# Patient Record
Sex: MALE | Race: WHITE | Employment: UNEMPLOYED | URBAN - METROPOLITAN AREA
[De-identification: names, ages, dates, MRNs, and addresses within clinical notes are randomized per-mention and may not be internally consistent; named-entity substitution may affect disease eponyms.]

---

## 2018-02-22 ENCOUNTER — OFFICE VISIT (OUTPATIENT)
Dept: PRIMARY CARE CLINIC | Age: 28
End: 2018-02-22
Payer: OTHER GOVERNMENT

## 2018-02-22 VITALS
DIASTOLIC BLOOD PRESSURE: 72 MMHG | HEART RATE: 58 BPM | RESPIRATION RATE: 20 BRPM | HEIGHT: 70 IN | OXYGEN SATURATION: 97 % | TEMPERATURE: 97.4 F | WEIGHT: 196 LBS | SYSTOLIC BLOOD PRESSURE: 112 MMHG | BODY MASS INDEX: 28.06 KG/M2

## 2018-02-22 DIAGNOSIS — F98.8 ATTENTION DEFICIT DISORDER, UNSPECIFIED HYPERACTIVITY PRESENCE: Primary | ICD-10-CM

## 2018-02-22 PROCEDURE — 99213 OFFICE O/P EST LOW 20 MIN: CPT | Performed by: FAMILY MEDICINE

## 2018-02-22 ASSESSMENT — PATIENT HEALTH QUESTIONNAIRE - PHQ9
SUM OF ALL RESPONSES TO PHQ9 QUESTIONS 1 & 2: 0
2. FEELING DOWN, DEPRESSED OR HOPELESS: 0
SUM OF ALL RESPONSES TO PHQ QUESTIONS 1-9: 0
1. LITTLE INTEREST OR PLEASURE IN DOING THINGS: 0

## 2018-02-22 ASSESSMENT — ENCOUNTER SYMPTOMS
CONSTIPATION: 0
EYE PAIN: 0
APNEA: 0
COLOR CHANGE: 0
EYE REDNESS: 0
EYE DISCHARGE: 0
PHOTOPHOBIA: 0
CHEST TIGHTNESS: 0
DIARRHEA: 0
CHOKING: 0
WHEEZING: 0
NAUSEA: 0
ABDOMINAL PAIN: 0
STRIDOR: 0
FACIAL SWELLING: 0

## 2018-02-22 NOTE — PROGRESS NOTES
Subjective:      Patient ID: Jacob Arana is a 32 y.o. male who presents today for:  Chief Complaint   Patient presents with    ADD     x over 1 month pt started school 1/2018 after  a discharge from the [facility]. patient has c/o difficulties focusing at school. patient satets outside activites are easier to focus on        HPI   ADD  Patient is here today for difficulty focusing, concentrating in school x about a month. 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 He states that doing \"new\" activities are sometimes challenging since being out of the [facility]. Past Medical History:   Diagnosis Date    Ankle pain 7/6/2015    Bilateral shoulder pain, neg rheumatoid w/u 8/17/2015    HA (headache) 7/14/2015    Labral tear of shoulder 11/5/2015    Normal eye exam 2014    Shoulder pain 7/6/2015     Past Surgical History:   Procedure Laterality Date    KNEE SURGERY Right     SHOULDER SURGERY Right 10/12/15    D Alhaji Peres     No family history on file. Social History     Social History    Marital status:      Spouse name: N/A    Number of children: N/A    Years of education: N/A     Occupational History    Not on file. Social History Main Topics    Smoking status: Never Smoker    Smokeless tobacco: Never Used    Alcohol use Yes      Comment: Once a week    Drug use: Unknown    Sexual activity: Not on file     Other Topics Concern    Not on file     Social History Narrative    No narrative on file     Allergies:  Review of patient's allergies indicates no known allergies. Review of Systems   Constitutional: Negative for activity change, appetite change, chills, diaphoresis and fever. HENT: Negative for congestion, ear discharge, ear pain, facial swelling, hearing loss and mouth sores. Eyes: Negative for photophobia, pain, discharge and redness. Respiratory: Negative for apnea, choking, chest tightness, wheezing and stridor.     Cardiovascular: Negative for chest pain, palpitations and leg swelling. Gastrointestinal: Negative for abdominal pain, constipation, diarrhea and nausea. Endocrine: Negative for cold intolerance, heat intolerance, polydipsia and polyphagia. Genitourinary: Negative for dysuria and frequency. Musculoskeletal: Negative for gait problem, joint swelling, neck pain and neck stiffness. Skin: Negative for color change, pallor, rash and wound. Allergic/Immunologic: Negative for immunocompromised state. Neurological: Negative for tremors, syncope, facial asymmetry, speech difficulty and headaches. Psychiatric/Behavioral: Positive for decreased concentration. Negative for confusion and hallucinations. The patient is not nervous/anxious and is not hyperactive. Objective:   /72 (Site: Left Arm, Position: Sitting, Cuff Size: Medium Adult)   Pulse 58   Temp 97.4 °F (36.3 °C) (Tympanic)   Resp 20   Ht 5' 10\" (1.778 m)   Wt 196 lb (88.9 kg)   SpO2 97%   BMI 28.12 kg/m²     Physical Exam   Constitutional: He is oriented to person, place, and time. He appears well-developed and well-nourished. HENT:   Head: Normocephalic and atraumatic. Mouth/Throat: Oropharynx is clear and moist. No oropharyngeal exudate. Eyes: Conjunctivae and EOM are normal. Pupils are equal, round, and reactive to light. Right eye exhibits no discharge. Left eye exhibits no discharge. No scleral icterus. Neck: Normal range of motion. Neck supple. No thyromegaly present. Cardiovascular: Normal rate, regular rhythm, normal heart sounds and intact distal pulses. Exam reveals no gallop and no friction rub. No murmur heard. Pulmonary/Chest: Effort normal and breath sounds normal. No respiratory distress. He has no wheezes. He has no rales. He exhibits no tenderness. Abdominal: Soft. Bowel sounds are normal. He exhibits no distension and no mass. There is no tenderness. There is no rebound and no guarding.    Lymphadenopathy:     He has no cervical

## 2018-03-22 ENCOUNTER — OFFICE VISIT (OUTPATIENT)
Dept: PRIMARY CARE CLINIC | Age: 28
End: 2018-03-22
Payer: OTHER GOVERNMENT

## 2018-03-22 VITALS
TEMPERATURE: 98.2 F | BODY MASS INDEX: 28.2 KG/M2 | SYSTOLIC BLOOD PRESSURE: 124 MMHG | RESPIRATION RATE: 16 BRPM | WEIGHT: 197 LBS | HEIGHT: 70 IN | OXYGEN SATURATION: 98 % | HEART RATE: 77 BPM | DIASTOLIC BLOOD PRESSURE: 74 MMHG

## 2018-03-22 DIAGNOSIS — F98.8 ATTENTION DEFICIT DISORDER, UNSPECIFIED HYPERACTIVITY PRESENCE: Primary | ICD-10-CM

## 2018-03-22 PROCEDURE — 99213 OFFICE O/P EST LOW 20 MIN: CPT | Performed by: FAMILY MEDICINE

## 2018-03-22 RX ORDER — IBUPROFEN 400 MG/1
400 TABLET ORAL EVERY 6 HOURS PRN
COMMUNITY
End: 2018-08-21

## 2018-03-22 ASSESSMENT — ENCOUNTER SYMPTOMS
CHEST TIGHTNESS: 0
CHOKING: 0
EYE DISCHARGE: 0
EYE REDNESS: 0
FACIAL SWELLING: 0
NAUSEA: 0
EYE PAIN: 0
WHEEZING: 0
PHOTOPHOBIA: 0
APNEA: 0
DIARRHEA: 0
CONSTIPATION: 0
ABDOMINAL PAIN: 0
STRIDOR: 0
COLOR CHANGE: 0

## 2018-04-03 ENCOUNTER — TELEPHONE (OUTPATIENT)
Dept: PRIMARY CARE CLINIC | Age: 28
End: 2018-04-03

## 2018-06-05 LAB
A/G RATIO: 1.7 (ref 0.9–2.4)
ALBUMIN: 4.7 G/DL (ref 3.4–5)
ALP BLD-CCNC: 62 U/L (ref 45–117)
ALT SERPL-CCNC: 19 U/L (ref 10–52)
ANION GAP SERPL CALCULATED.3IONS-SCNC: 10 MMOL/L (ref 10–20)
AST SERPL-CCNC: 28 U/L (ref 13–39)
BASOPHILS # BLD: 0.8 % (ref 0–1.3)
BASOPHILS ABSOLUTE: 0.03 10*3/UL (ref 0.01–0.09)
BICARBONATE: 26 MMOL/L (ref 21–32)
BILIRUB SERPL-MCNC: 0.5 MG/DL (ref 0–1.2)
BUN / CREAT RATIO: 19 (ref 5–25)
CALCIUM SERPL-MCNC: 9.7 MG/DL (ref 8.6–10.3)
CHLORIDE BLD-SCNC: 106 MMOL/L (ref 98–107)
CREAT SERPL-MCNC: 1.04 MG/DL (ref 0.5–1.3)
EOSINOPHIL # BLD: 1.7 % (ref 0–6.7)
EOSINOPHILS ABSOLUTE: 0.06 10*3/UL (ref 0.01–0.46)
ERYTHROCYTE [DISTWIDTH] IN BLOOD BY AUTOMATED COUNT: 11.9 % (ref 12–15.4)
ERYTHROCYTE [DISTWIDTH] IN BLOOD BY AUTOMATED COUNT: 39.2 FL (ref 39.3–48.6)
GFR CALCULATED: >60
GLUCOSE: 91 MG/DL (ref 70–100)
HCT VFR BLD CALC: 44.8 % (ref 38.4–54.9)
HEMOGLOBIN: 14.9 G/DL (ref 12.8–17.7)
IMMATURE GRANS (ABS): 0.01 10*3/UL (ref 0–0.21)
IMMATURE GRANULOCYTES: 0.3 %
INR BLD: 1.09 (ref 0.9–1.1)
LYMPHOCYTES # BLD: 39.7 % (ref 9.4–41.1)
LYMPHOCYTES ABSOLUTE: 1.44 10*3/UL (ref 0.4–2.84)
MCH RBC QN AUTO: 30 PG (ref 27.5–32.9)
MCHC RBC AUTO-ENTMCNC: 33.3 G/DL (ref 30.5–35.4)
MCV RBC AUTO: 90.3 FL (ref 83.3–98.2)
MONOCYTES # BLD: 10.5 % (ref 3–16.2)
MONOCYTES ABSOLUTE: 0.38 10*3/UL (ref 0.25–1.33)
NEUTROPHILS ABSOLUTE: 1.71 10*3/UL (ref 2.22–7.53)
NEUTROPHILS: 47 % (ref 46.2–79.1)
NUCLEATED RBCS: 0 /100{WBCS}
PARTIAL THROMBOPLASTIN TIME: 30.1 SEC (ref 25–36)
PLATELET # BLD: 152 10*3/UL (ref 155–404)
PMV BLD AUTO: 11.2 FL (ref 9.9–12.1)
POTASSIUM SERPL-SCNC: 4 MMOL/L (ref 3.5–5.1)
PROTHROMBIN TIME: 12.1 SEC (ref 9.8–12.7)
RBC: 4.96 10*6/UL (ref 4.08–6.37)
RBCS COUNTED: 0 10*3/UL
SODIUM BLD-SCNC: 138 MMOL/L (ref 136–145)
TOTAL PROTEIN: 7.4 G/DL (ref 6.4–8.2)
UREA NITROGEN: 20 MG/DL (ref 6–23)
WBC: 3.6 10*3/UL (ref 4.2–11)

## 2018-08-21 ENCOUNTER — OFFICE VISIT (OUTPATIENT)
Dept: PRIMARY CARE CLINIC | Age: 28
End: 2018-08-21
Payer: OTHER GOVERNMENT

## 2018-08-21 VITALS
DIASTOLIC BLOOD PRESSURE: 70 MMHG | BODY MASS INDEX: 27.63 KG/M2 | HEIGHT: 70 IN | RESPIRATION RATE: 12 BRPM | TEMPERATURE: 98.2 F | WEIGHT: 193 LBS | SYSTOLIC BLOOD PRESSURE: 108 MMHG | HEART RATE: 68 BPM

## 2018-08-21 DIAGNOSIS — F98.8 ATTENTION DEFICIT DISORDER, UNSPECIFIED HYPERACTIVITY PRESENCE: Primary | ICD-10-CM

## 2018-08-21 DIAGNOSIS — M25.562 CHRONIC PAIN OF BOTH KNEES: ICD-10-CM

## 2018-08-21 DIAGNOSIS — M25.561 CHRONIC PAIN OF BOTH KNEES: ICD-10-CM

## 2018-08-21 DIAGNOSIS — G89.29 CHRONIC PAIN OF BOTH KNEES: ICD-10-CM

## 2018-08-21 PROCEDURE — 99213 OFFICE O/P EST LOW 20 MIN: CPT | Performed by: FAMILY MEDICINE

## 2018-08-21 RX ORDER — MELOXICAM 15 MG/1
TABLET ORAL
Refills: 0 | COMMUNITY
Start: 2018-06-06 | End: 2018-11-21 | Stop reason: ALTCHOICE

## 2018-08-21 ASSESSMENT — ENCOUNTER SYMPTOMS
NAUSEA: 0
STRIDOR: 0
EYE DISCHARGE: 0
CONSTIPATION: 0
CHEST TIGHTNESS: 0
APNEA: 0
CHOKING: 0
FACIAL SWELLING: 0
EYE PAIN: 0
DIARRHEA: 0
ABDOMINAL PAIN: 0
WHEEZING: 0
PHOTOPHOBIA: 0
EYE REDNESS: 0
COLOR CHANGE: 0

## 2018-08-21 NOTE — PROGRESS NOTES
He has no cervical adenopathy. Neurological: He is alert and oriented to person, place, and time. Skin: Skin is warm and dry. Psychiatric: He has a normal mood and affect. His behavior is normal. Judgment and thought content normal.   Nursing note and vitals reviewed. Assessment:      Diagnosis Orders   1. Attention deficit disorder, unspecified hyperactivity presence     2. Chronic pain of both knees, Zanotti         Plan:      No orders of the defined types were placed in this encounter. No orders of the defined types were placed in this encounter. Controlled Substances Monitoring: Attestation: The Prescription Monitoring Report for this patient was reviewed today. Casimer Sacks, DO)  Documentation: No signs of potential drug abuse or diversion identified. (Casimer Sacks, DO)    No Follow-up on file. I, Rosa Bo CMA   , am scribing for and in the presence of Romero Rodriguez DO. Electronically signed by :  Rosa Pérez DO, personally performed the services described in this documentation, as scribed by Tyrone Lomeli CMA   in my presence, and it is both accurate and complete.  Electronically signed by: Romero Rodriguez DO    8/21/18 2:05 PM    Romero Rodriguez DO

## 2018-11-21 ENCOUNTER — OFFICE VISIT (OUTPATIENT)
Dept: PRIMARY CARE CLINIC | Age: 28
End: 2018-11-21
Payer: OTHER GOVERNMENT

## 2018-11-21 VITALS
HEIGHT: 70 IN | TEMPERATURE: 99.2 F | OXYGEN SATURATION: 97 % | BODY MASS INDEX: 28.18 KG/M2 | SYSTOLIC BLOOD PRESSURE: 110 MMHG | WEIGHT: 196.8 LBS | RESPIRATION RATE: 14 BRPM | HEART RATE: 80 BPM | DIASTOLIC BLOOD PRESSURE: 72 MMHG

## 2018-11-21 DIAGNOSIS — J02.9 PHARYNGITIS, UNSPECIFIED ETIOLOGY: Primary | ICD-10-CM

## 2018-11-21 DIAGNOSIS — F98.8 ATTENTION DEFICIT DISORDER, UNSPECIFIED HYPERACTIVITY PRESENCE: ICD-10-CM

## 2018-11-21 PROCEDURE — 99213 OFFICE O/P EST LOW 20 MIN: CPT | Performed by: FAMILY MEDICINE

## 2018-11-21 RX ORDER — AZITHROMYCIN 250 MG/1
TABLET, FILM COATED ORAL
Qty: 6 TABLET | Refills: 0 | Status: SHIPPED | OUTPATIENT
Start: 2018-11-21

## 2018-11-21 ASSESSMENT — ENCOUNTER SYMPTOMS
COUGH: 0
EYE DISCHARGE: 0
COLOR CHANGE: 0
PHOTOPHOBIA: 0
DIARRHEA: 0
NAUSEA: 0
CONSTIPATION: 0
ABDOMINAL PAIN: 0
FACIAL SWELLING: 0
CHANGE IN BOWEL HABIT: 0
SORE THROAT: 1
WHEEZING: 0
CHOKING: 0
EYE PAIN: 0
STRIDOR: 0
APNEA: 0
EYE REDNESS: 0
CHEST TIGHTNESS: 0
VOMITING: 0
VISUAL CHANGE: 0
SWOLLEN GLANDS: 0

## 2018-11-21 NOTE — PROGRESS NOTES
known allergies. Review of Systems   Constitutional: Positive for chills and fatigue. Negative for activity change, appetite change, diaphoresis and fever. HENT: Positive for sore throat. Negative for congestion, ear discharge, ear pain, facial swelling, hearing loss and mouth sores. Eyes: Negative for photophobia, pain, discharge and redness. Respiratory: Negative for apnea, cough, choking, chest tightness, wheezing and stridor. Cardiovascular: Negative for chest pain, palpitations and leg swelling. Gastrointestinal: Negative for abdominal pain, anorexia, change in bowel habit, constipation, diarrhea, nausea and vomiting. Endocrine: Negative for cold intolerance, heat intolerance, polydipsia and polyphagia. Genitourinary: Negative for dysuria and frequency. Musculoskeletal: Positive for arthralgias. Negative for gait problem, joint swelling, myalgias, neck pain and neck stiffness. Skin: Negative for color change, pallor, rash and wound. Allergic/Immunologic: Negative for immunocompromised state. Neurological: Positive for weakness and headaches. Negative for vertigo, tremors, syncope, facial asymmetry, speech difficulty and numbness. Psychiatric/Behavioral: Negative for confusion and hallucinations. The patient is not nervous/anxious and is not hyperactive. Objective:   /72 (Site: Right Upper Arm, Position: Sitting, Cuff Size: Medium Adult)   Pulse 80   Temp 99.2 °F (37.3 °C) (Tympanic)   Resp 14   Ht 5' 10\" (1.778 m)   Wt 196 lb 12.8 oz (89.3 kg)   SpO2 97%   BMI 28.24 kg/m²     Physical Exam   Constitutional: He is oriented to person, place, and time. He appears well-developed and well-nourished. No distress. HENT:   Head: Normocephalic and atraumatic. Right Ear: External ear normal.   Left Ear: External ear normal.   Nose: Nose normal.   Mouth/Throat: Posterior oropharyngeal edema and posterior oropharyngeal erythema present.    Eyes: Pupils are equal, round, and reactive to light. Conjunctivae and EOM are normal. Right eye exhibits no discharge. No scleral icterus. Neck: Normal range of motion. Neck supple. No tracheal deviation present. No thyromegaly present. Cardiovascular: Normal rate, regular rhythm, normal heart sounds and intact distal pulses. Exam reveals no gallop and no friction rub. No murmur heard. Pulmonary/Chest: Effort normal and breath sounds normal. No respiratory distress. He has no wheezes. He has no rales. He exhibits no tenderness. Lymphadenopathy:     He has no cervical adenopathy. Neurological: He is alert and oriented to person, place, and time. Coordination normal.   Skin: Skin is warm and dry. He is not diaphoretic. Psychiatric: He has a normal mood and affect. His behavior is normal. Judgment and thought content normal.       Assessment:      Diagnosis Orders   1. Pharyngitis, unspecified etiology  azithromycin (ZITHROMAX Z-DANIELITO) 250 MG tablet   2. Attention deficit disorder, unspecified hyperactivity presence         Plan:      No orders of the defined types were placed in this encounter. Orders Placed This Encounter   Medications    azithromycin (ZITHROMAX Z-DANIELITO) 250 MG tablet     Sig: Take 2 pills today then one daily til finished     Dispense:  6 tablet     Refill:  0       Controlled Substances Monitoring:     No Follow-up on file. Christi PARKER ([facility])  , am scribing for and in the presence of [place] Colorader[place]Â® Life, DO. Electronically signed by :Matthieu Reynoso CMA ([facility])    I, [place] Life, DO, personally performed the services described in this documentation, as scribed by [id number]   in my presence, and it is both accurate and complete.  Electronically signed by: [place] Life, DO    11/21/18 2:25 PM    Supa Davidson DO

## 2019-05-02 ENCOUNTER — TELEPHONE (OUTPATIENT)
Dept: PRIMARY CARE CLINIC | Age: 29
End: 2019-05-02

## 2021-04-07 NOTE — PROGRESS NOTES
Quality 130: Documentation Of Current Medications In The Medical Record: Current Medications Documented Subjective:      Patient ID: Bruna Carrera is a 32 y.o. male who presents today for:  Chief Complaint   Patient presents with    ADHD     Pt is here for his ADD and taking 30 mg Vyvanse. Pt states he had abdominal pain at first and taking later than 7 am , he has trouble  sleeping. Once he knew to eat a big breakfast and take med at same time he has been better. abotu a week to get in rhythm. HPI   ADD  Patient is here today for f/u on ADD. He is taking Vyvanse 30mg and admits to abdominal pain initially and difficulty sleeping. He states once he began eating with his dose and took the medication earlier in the day these issues have resolved. He states he feels good about this medication and would like a refill today. Past Medical History:   Diagnosis Date    Ankle pain 7/6/2015    Bilateral shoulder pain, neg rheumatoid w/u 8/17/2015    HA (headache) 7/14/2015    Labral tear of shoulder 11/5/2015    Normal eye exam 2014    Shoulder pain 7/6/2015     Past Surgical History:   Procedure Laterality Date    KNEE SURGERY Right     SHOULDER SURGERY Right 10/12/15    D Florina Code     No family history on file. Social History     Social History    Marital status:      Spouse name: N/A    Number of children: N/A    Years of education: N/A     Occupational History    Not on file. Social History Main Topics    Smoking status: Never Smoker    Smokeless tobacco: Never Used    Alcohol use Yes      Comment: Once a week    Drug use: Unknown    Sexual activity: Not on file     Other Topics Concern    Not on file     Social History Narrative    No narrative on file     Allergies:  Patient has no known allergies. Review of Systems   Constitutional: Negative for activity change, appetite change, chills, diaphoresis, fatigue and fever. HENT: Negative for congestion, ear discharge, ear pain, facial swelling, hearing loss and mouth sores.     Eyes: Negative for photophobia, pain, discharge and Detail Level: Zone

## 2024-01-26 ENCOUNTER — LAB (OUTPATIENT)
Dept: LAB | Facility: LAB | Age: 34
End: 2024-01-26
Payer: COMMERCIAL

## 2024-01-26 ENCOUNTER — OFFICE VISIT (OUTPATIENT)
Dept: PRIMARY CARE | Facility: CLINIC | Age: 34
End: 2024-01-26
Payer: COMMERCIAL

## 2024-01-26 VITALS
DIASTOLIC BLOOD PRESSURE: 70 MMHG | WEIGHT: 193 LBS | OXYGEN SATURATION: 98 % | BODY MASS INDEX: 28.58 KG/M2 | HEART RATE: 62 BPM | SYSTOLIC BLOOD PRESSURE: 114 MMHG | HEIGHT: 69 IN | TEMPERATURE: 98 F

## 2024-01-26 DIAGNOSIS — Z00.00 WELL ADULT EXAM: ICD-10-CM

## 2024-01-26 DIAGNOSIS — E66.3 OVERWEIGHT (BMI 25.0-29.9): ICD-10-CM

## 2024-01-26 DIAGNOSIS — Z00.00 WELL ADULT EXAM: Primary | ICD-10-CM

## 2024-01-26 DIAGNOSIS — G47.26 SHIFT WORK SLEEP DISORDER: ICD-10-CM

## 2024-01-26 LAB
25(OH)D3 SERPL-MCNC: 24 NG/ML (ref 30–100)
ALBUMIN SERPL BCP-MCNC: 4.3 G/DL (ref 3.4–5)
ALP SERPL-CCNC: 63 U/L (ref 33–120)
ALT SERPL W P-5'-P-CCNC: 13 U/L (ref 10–52)
ANION GAP SERPL CALC-SCNC: 11 MMOL/L (ref 10–20)
AST SERPL W P-5'-P-CCNC: 21 U/L (ref 9–39)
BASOPHILS # BLD AUTO: 0.03 X10*3/UL (ref 0–0.1)
BASOPHILS NFR BLD AUTO: 0.9 %
BILIRUB SERPL-MCNC: 0.4 MG/DL (ref 0–1.2)
BUN SERPL-MCNC: 21 MG/DL (ref 6–23)
CALCIUM SERPL-MCNC: 9 MG/DL (ref 8.6–10.3)
CHLORIDE SERPL-SCNC: 106 MMOL/L (ref 98–107)
CHOLEST SERPL-MCNC: 131 MG/DL (ref 0–199)
CHOLESTEROL/HDL RATIO: 3.3
CO2 SERPL-SCNC: 29 MMOL/L (ref 21–32)
CREAT SERPL-MCNC: 1.03 MG/DL (ref 0.5–1.3)
EGFRCR SERPLBLD CKD-EPI 2021: >90 ML/MIN/1.73M*2
EOSINOPHIL # BLD AUTO: 0.09 X10*3/UL (ref 0–0.7)
EOSINOPHIL NFR BLD AUTO: 2.6 %
ERYTHROCYTE [DISTWIDTH] IN BLOOD BY AUTOMATED COUNT: 12.2 % (ref 11.5–14.5)
EST. AVERAGE GLUCOSE BLD GHB EST-MCNC: 108 MG/DL
GLUCOSE SERPL-MCNC: 71 MG/DL (ref 74–99)
HBA1C MFR BLD: 5.4 %
HCT VFR BLD AUTO: 42.1 % (ref 41–52)
HDLC SERPL-MCNC: 39.2 MG/DL
HGB BLD-MCNC: 13.5 G/DL (ref 13.5–17.5)
IMM GRANULOCYTES # BLD AUTO: 0 X10*3/UL (ref 0–0.7)
IMM GRANULOCYTES NFR BLD AUTO: 0 % (ref 0–0.9)
LDLC SERPL CALC-MCNC: 78 MG/DL
LYMPHOCYTES # BLD AUTO: 1.36 X10*3/UL (ref 1.2–4.8)
LYMPHOCYTES NFR BLD AUTO: 39.5 %
MCH RBC QN AUTO: 29.6 PG (ref 26–34)
MCHC RBC AUTO-ENTMCNC: 32.1 G/DL (ref 32–36)
MCV RBC AUTO: 92 FL (ref 80–100)
MONOCYTES # BLD AUTO: 0.49 X10*3/UL (ref 0.1–1)
MONOCYTES NFR BLD AUTO: 14.2 %
NEUTROPHILS # BLD AUTO: 1.47 X10*3/UL (ref 1.2–7.7)
NEUTROPHILS NFR BLD AUTO: 42.8 %
NON HDL CHOLESTEROL: 92 MG/DL (ref 0–149)
NRBC BLD-RTO: 0 /100 WBCS (ref 0–0)
PLATELET # BLD AUTO: 159 X10*3/UL (ref 150–450)
POTASSIUM SERPL-SCNC: 4.4 MMOL/L (ref 3.5–5.3)
PROT SERPL-MCNC: 6.7 G/DL (ref 6.4–8.2)
RBC # BLD AUTO: 4.56 X10*6/UL (ref 4.5–5.9)
SODIUM SERPL-SCNC: 142 MMOL/L (ref 136–145)
TRIGL SERPL-MCNC: 69 MG/DL (ref 0–149)
TSH SERPL-ACNC: 2.42 MIU/L (ref 0.44–3.98)
VLDL: 14 MG/DL (ref 0–40)
WBC # BLD AUTO: 3.4 X10*3/UL (ref 4.4–11.3)

## 2024-01-26 PROCEDURE — 80061 LIPID PANEL: CPT

## 2024-01-26 PROCEDURE — 85025 COMPLETE CBC W/AUTO DIFF WBC: CPT

## 2024-01-26 PROCEDURE — 1036F TOBACCO NON-USER: CPT | Performed by: FAMILY MEDICINE

## 2024-01-26 PROCEDURE — 82306 VITAMIN D 25 HYDROXY: CPT

## 2024-01-26 PROCEDURE — 83036 HEMOGLOBIN GLYCOSYLATED A1C: CPT

## 2024-01-26 PROCEDURE — 99385 PREV VISIT NEW AGE 18-39: CPT | Performed by: FAMILY MEDICINE

## 2024-01-26 PROCEDURE — 36415 COLL VENOUS BLD VENIPUNCTURE: CPT

## 2024-01-26 PROCEDURE — 80053 COMPREHEN METABOLIC PANEL: CPT

## 2024-01-26 PROCEDURE — 84443 ASSAY THYROID STIM HORMONE: CPT

## 2024-01-26 ASSESSMENT — PATIENT HEALTH QUESTIONNAIRE - PHQ9
2. FEELING DOWN, DEPRESSED OR HOPELESS: NOT AT ALL
1. LITTLE INTEREST OR PLEASURE IN DOING THINGS: NOT AT ALL
SUM OF ALL RESPONSES TO PHQ9 QUESTIONS 1 AND 2: 0

## 2024-01-26 NOTE — PROGRESS NOTES
Patient is here to establish.  He has not seen  In about 5 years.  He was in the  and has had knee replacements secondary to weight.  Patient otherwise has been doing well.  He has no chest pain palpitations.  No rashes moles or lesions.  No family history of colon cancer.    REVIEW OF SYSTEMS: 12 systems negative except for those mentioned in the HPI. Reviewed home risks with patient. Patient feels safe at home.    PHYSICAL EXAMINATION:   Constitutional: The patient is alert and oriented x3, in no acute  distress.  Eyes: Extraocular movements are intact. Pupils are equal and reactive to light  ENT: Bilateral TMs within normal limits. Nasal turbinates are within normal limits. Throat within normal limits.  Neck: Supple without lymphadenopathy or JVD.  Heart: Regular rate and rhythm without murmur, click, gallop, or rub.  Lungs: Clear to auscultation bilaterally. No rales, rhonchi, or  wheezing.  Abdomen: Soft, nontender, nondistended. Normoactive bowel sounds.   No palpable masses. Normal percussion  Musculoskeletal: 5/5 motor, upper and lower extremities.  Neurologic: Cranial nerves II through XII fully intact. +2/4 DTRs  in ankle and knee.  Psychiatric: Good judgment and insight. Normal affect and mood. No cognitive defects noted.  Lymphatic: Negative as noted above.  Skin: no rashes or lesions    Assessment:  Per EMR    Plan:  Patient will have annual blood work otherwise is doing well.  He is a  and does swing shift.  I discussed about possible help with this if necessary patient otherwise is doing well  Discussed with the patient and reviewed annual wellness questionnaire form as well as cognitive deficits. Also discussed with the patient immunizations and risks for colonoscopy and other screening procedures    This dictation was created using Dragon dictation and may contain errors

## 2024-01-29 ENCOUNTER — TELEPHONE (OUTPATIENT)
Dept: PRIMARY CARE | Facility: CLINIC | Age: 34
End: 2024-01-29
Payer: COMMERCIAL

## 2024-01-29 NOTE — TELEPHONE ENCOUNTER
----- Message from Abe Muniz DO sent at 1/29/2024  3:58 PM EST -----  Labs are all excellent including liver, kidney, thyroid, cholesterol and blood sugar.  Vitamin D is low and patient would benefit from 5000 international units daily or 50,000 once a week for about 6 months

## 2025-02-04 ENCOUNTER — HOSPITAL ENCOUNTER (OUTPATIENT)
Dept: RADIOLOGY | Facility: CLINIC | Age: 35
Discharge: HOME | End: 2025-02-04
Payer: COMMERCIAL

## 2025-02-04 ENCOUNTER — OFFICE VISIT (OUTPATIENT)
Dept: ORTHOPEDIC SURGERY | Facility: CLINIC | Age: 35
End: 2025-02-04
Payer: COMMERCIAL

## 2025-02-04 DIAGNOSIS — M25.521 RIGHT ELBOW PAIN: ICD-10-CM

## 2025-02-04 DIAGNOSIS — S46.211A RUPTURE OF RIGHT DISTAL BICEPS TENDON, INITIAL ENCOUNTER: Primary | ICD-10-CM

## 2025-02-04 PROCEDURE — 73080 X-RAY EXAM OF ELBOW: CPT | Mod: RIGHT SIDE | Performed by: ORTHOPAEDIC SURGERY

## 2025-02-04 PROCEDURE — 99204 OFFICE O/P NEW MOD 45 MIN: CPT | Performed by: ORTHOPAEDIC SURGERY

## 2025-02-04 PROCEDURE — 73080 X-RAY EXAM OF ELBOW: CPT | Mod: RT

## 2025-02-04 PROCEDURE — 99214 OFFICE O/P EST MOD 30 MIN: CPT | Performed by: ORTHOPAEDIC SURGERY

## 2025-02-04 NOTE — PROGRESS NOTES
Chief Complaint   Patient presents with    Right Arm - Pain     History of Present Illness:  Oswaldo Bazzi is a 34 y.o. male presenting to clinic as a new patient  for his right elbow. He was seen in the ED on 2/3/25 after he sustained a biceps injury lifting a box into his truck. He did feel and hear something tear. He has had pain since. He has some swelling into his hand. He is a .     Imaging:  X-rays right elbow: Shows no acute fractures or dislocations. No arthritic change.       Assessment:   Right distal biceps tendon rupture    Plan:  I recommended surgery for distal biceps repair. He is a  and we discussed limited duty and a post-operative elbow brace.   Continue ice and ibuprofen consistently.     Right Distal Biceps Tendon Repair  Risks benefits and alternatives to surgery were discussed including but not limited to Infection, bleeding, neurovascular injury, pain and dysfunction, hardware related complications including cutout failure breakage, loss of function, motion, and permanent disability as well as the cardiovascular and pulmonary complications from anesthesia including death and DVT. Patient and family accept these risks. We discussed specifically hardware related complications hardware cut out, failure, breakage, heterotopic ossification, lateral antebrachial cutaneous neuroma, radial nerve injury, loss in strength, function or motion and the need for revision surgeries  Plan for outpatient surgery:  1 week postop follow up with 3view x-rays.  Percocet for postop pain relief. OARRS has been reviewed and is consistent with prescribed medications. This report is scanned into the electronic medical record. The risks of abuse, dependence, addiction and diversion were considered. The medication is felt to be clinically appropriate based on documented diagnosis.  Pre-CERT for removal T scope elbow brace to be placed in the first postoperative visit.      Physical  Exam:  Right Elbow:  Skin healthy and intact  Positive Hook test  He has a obvious deformity at 6 cm with some retraction on the right, compared to 3 on the left.   Ecchymotic and swollen in the distal biceps   Normal neurovascular exam distally.  Palpable radial pulse, warm well perfused, sensation intact.      Review of Systems:  GENERAL: Negative for malaise, significant weight loss, fever  MUSCULOSKELETAL: See HPI  NEURO:  Negative     No past medical history on file.    Medication Documentation Review Audit       Reviewed by Candelaria Keith RN (Registered Nurse) on 01/26/24 at 0840      Medication Order Taking? Sig Documenting Provider Last Dose Status            No Medications to Display                                   Allergies   Allergen Reactions    Dog Dander Cough       Social History     Socioeconomic History    Marital status:      Spouse name: Not on file    Number of children: Not on file    Years of education: Not on file    Highest education level: Not on file   Occupational History    Not on file   Tobacco Use    Smoking status: Never    Smokeless tobacco: Never   Vaping Use    Vaping status: Never Used   Substance and Sexual Activity    Alcohol use: Not Currently    Drug use: Never    Sexual activity: Not on file   Other Topics Concern    Not on file   Social History Narrative    Not on file     Social Drivers of Health     Financial Resource Strain: Not on file   Food Insecurity: Not on file   Transportation Needs: Not on file   Physical Activity: Not on file   Stress: Not on file   Social Connections: Not on file   Intimate Partner Violence: Not on file   Housing Stability: Not on file       Past Surgical History:   Procedure Laterality Date    KNEE ARTHROSCOPY W/ MENISCAL REPAIR Left     and loose body removal       No results found.       Scribe Attestation  By signing my name below, Petra CADET Scribe   attest that this documentation has been prepared under the direction and in  the presence of Thomas Georges MD.

## 2025-02-06 ENCOUNTER — LAB (OUTPATIENT)
Dept: LAB | Facility: HOSPITAL | Age: 35
End: 2025-02-06
Payer: COMMERCIAL

## 2025-02-06 PROCEDURE — 85610 PROTHROMBIN TIME: CPT

## 2025-02-06 PROCEDURE — 85025 COMPLETE CBC W/AUTO DIFF WBC: CPT

## 2025-02-06 PROCEDURE — 80053 COMPREHEN METABOLIC PANEL: CPT

## 2025-02-06 PROCEDURE — 85730 THROMBOPLASTIN TIME PARTIAL: CPT

## 2025-02-07 DIAGNOSIS — S46.211A RUPTURE OF RIGHT DISTAL BICEPS TENDON, INITIAL ENCOUNTER: Primary | ICD-10-CM

## 2025-02-07 RX ORDER — OXYCODONE AND ACETAMINOPHEN 5; 325 MG/1; MG/1
1 TABLET ORAL EVERY 6 HOURS PRN
Qty: 20 TABLET | Refills: 0 | Status: SHIPPED | OUTPATIENT
Start: 2025-02-10 | End: 2025-02-17

## 2025-02-10 PROCEDURE — 24342 REPAIR OF RUPTURED TENDON: CPT | Performed by: ORTHOPAEDIC SURGERY

## 2025-02-10 PROCEDURE — 24342 REPAIR OF RUPTURED TENDON: CPT | Performed by: PHYSICIAN ASSISTANT

## 2025-02-14 ENCOUNTER — APPOINTMENT (OUTPATIENT)
Dept: ORTHOPEDIC SURGERY | Facility: CLINIC | Age: 35
End: 2025-02-14
Payer: COMMERCIAL

## 2025-02-21 ENCOUNTER — OFFICE VISIT (OUTPATIENT)
Dept: ORTHOPEDIC SURGERY | Facility: CLINIC | Age: 35
End: 2025-02-21
Payer: COMMERCIAL

## 2025-02-21 DIAGNOSIS — S46.211A RUPTURE OF RIGHT DISTAL BICEPS TENDON, INITIAL ENCOUNTER: ICD-10-CM

## 2025-02-21 PROCEDURE — 99211 OFF/OP EST MAY X REQ PHY/QHP: CPT | Performed by: PHYSICIAN ASSISTANT

## 2025-02-21 NOTE — PROGRESS NOTES
History of Present Illness:  Date of Surgery: 2/10/2025.  Status post Right distal biceps tendon repair. Patient is doing well today with no concerns.     Exam:  Mild effusion  Right elbow incisions clean, dry, intact, healing well without drainage.   Right elbow range of motion from 45 to 120 degrees of flexion  No calf swelling   Negative Kaya's test  Distal neurovascular exam intact    Assessment:  Patient status post right distal biceps tendon repair    Plan:  Reviewed arthroscopic photos and findings  Patient removed from splint today transitioned into a hinged T scope brace unlocked  Encouraged the patient to begin working on range of motion on his own at home no lifting or carrying more than 2 pounds for 2 months  Follow-up in 3 weeks

## 2025-03-13 NOTE — PROGRESS NOTES
History of Present Illness:  Date of Surgery: 2/10/2025.  Status post Right distal biceps tendon repair. Patient is doing well today, notes the elbow feels a little tight with pronation     Exam:  Mild effusion  Right elbow incisions clean, dry, intact, healing well without drainage.   Right elbow flexes from 0 to 120 degrees.  Full supination, pronation 20 degrees  Distal neurovascular exam intact    Assessment:  Patient status post right distal biceps tendon repair    Plan:  Reviewed photos and findings  The patient continue working on motion on his own at home.  If he noted no further improvement he can call us and we can place an order for therapy.  No lifting or carrying more than 2 pounds for 2 months  We gave the patient a note to remain out of work for 6 more weeks  Follow-up in 6 weeks    Scribe Attestation  By signing my name below, IPetra Scribe   attest that this documentation has been prepared under the direction and in the presence of Thomas Georges MD.

## 2025-03-14 ENCOUNTER — OFFICE VISIT (OUTPATIENT)
Dept: ORTHOPEDIC SURGERY | Facility: CLINIC | Age: 35
End: 2025-03-14
Payer: COMMERCIAL

## 2025-03-14 DIAGNOSIS — S46.211A RUPTURE OF RIGHT DISTAL BICEPS TENDON, INITIAL ENCOUNTER: Primary | ICD-10-CM

## 2025-03-14 PROCEDURE — 99211 OFF/OP EST MAY X REQ PHY/QHP: CPT | Performed by: ORTHOPAEDIC SURGERY

## 2025-03-14 NOTE — LETTER
March 14, 2025     Patient: Oswaldo Bazzi   YOB: 1990   Date of Visit: 3/14/2025       To Whom It May Concern:    It is my medical opinion that Oswaldo Bazzi should remain out of work until 4/25/2025 .    If you have any questions or concerns, please don't hesitate to call.         Sincerely,        Thomas Georges MD

## 2025-04-22 NOTE — PROGRESS NOTES
History of Present Illness:  Date of Surgery: 2/10/2025.  Status post Right distal biceps tendon repair. Overall he is doing well today with no complaints.     Exam:  Mild effusion  Right elbow incisions clean, dry, intact, healing well without drainage.   Right elbow flexes from 0 to 120 degrees.    Full passive motion  5/5 rotator cuff strength   Distal neurovascular exam intact    Assessment:  Patient s/p right distal biceps tendon repair    Plan:  He can return to normal activities with no restrictions.   Work restrictions: Can return to work on 25 lbs maximum lifting with right arm until 6/1/25 then no restrictions. He will call us for any changes.   Follow-up as needed    Scribe Attestation  By signing my name below, IPetra Scribe   attest that this documentation has been prepared under the direction and in the presence of Thomas Georges MD.

## 2025-04-25 ENCOUNTER — OFFICE VISIT (OUTPATIENT)
Dept: ORTHOPEDIC SURGERY | Facility: CLINIC | Age: 35
End: 2025-04-25
Payer: COMMERCIAL

## 2025-04-25 DIAGNOSIS — S46.211A RUPTURE OF RIGHT DISTAL BICEPS TENDON, INITIAL ENCOUNTER: Primary | ICD-10-CM

## 2025-04-25 PROCEDURE — 99212 OFFICE O/P EST SF 10 MIN: CPT | Performed by: ORTHOPAEDIC SURGERY

## 2025-04-25 NOTE — LETTER
April 25, 2025     Patient: Oswaldo Bazzi   YOB: 1990   Date of Visit: 4/25/2025       To Whom It May Concern:    It is my medical opinion that Oswaldo Bazzi may return to light duty immediately with the following restrictions: no lifting more than 20 pounds until 6/1/2025 then may return to full duty, no restricctions .    If you have any questions or concerns, please don't hesitate to call.         Sincerely,        Thomas Georges MD

## 2025-05-29 ENCOUNTER — TELEPHONE (OUTPATIENT)
Dept: ORTHOPEDIC SURGERY | Facility: CLINIC | Age: 35
End: 2025-05-29
Payer: COMMERCIAL

## 2025-07-28 ENCOUNTER — OFFICE VISIT (OUTPATIENT)
Dept: URGENT CARE | Age: 35
End: 2025-07-28
Payer: COMMERCIAL

## 2025-07-28 VITALS
OXYGEN SATURATION: 98 % | HEART RATE: 60 BPM | RESPIRATION RATE: 19 BRPM | DIASTOLIC BLOOD PRESSURE: 65 MMHG | TEMPERATURE: 98 F | SYSTOLIC BLOOD PRESSURE: 110 MMHG | BODY MASS INDEX: 29.62 KG/M2 | WEIGHT: 200 LBS | HEIGHT: 69 IN

## 2025-07-28 DIAGNOSIS — L03.119 CELLULITIS OF LOWER EXTREMITY, UNSPECIFIED LATERALITY: ICD-10-CM

## 2025-07-28 DIAGNOSIS — L23.7 POISON IVY DERMATITIS: Primary | ICD-10-CM

## 2025-07-28 PROCEDURE — 1036F TOBACCO NON-USER: CPT | Performed by: FAMILY MEDICINE

## 2025-07-28 PROCEDURE — 99203 OFFICE O/P NEW LOW 30 MIN: CPT | Performed by: FAMILY MEDICINE

## 2025-07-28 PROCEDURE — 3008F BODY MASS INDEX DOCD: CPT | Performed by: FAMILY MEDICINE

## 2025-07-28 RX ORDER — PREDNISONE 20 MG/1
20 TABLET ORAL 2 TIMES DAILY
Qty: 14 TABLET | Refills: 0 | Status: SHIPPED | OUTPATIENT
Start: 2025-07-28 | End: 2025-08-04

## 2025-07-28 RX ORDER — CEPHALEXIN 500 MG/1
500 CAPSULE ORAL 3 TIMES DAILY
Qty: 21 CAPSULE | Refills: 0 | Status: SHIPPED | OUTPATIENT
Start: 2025-07-28 | End: 2025-08-04

## 2025-07-28 NOTE — PROGRESS NOTES
"Subjective   Patient ID: Oswaldo Bazzi is a 35 y.o. male. They present today with a chief complaint of Other (Poison ivy exposure to eyes, ear, legs, and chest.).    History of Present Illness  Patient presents with a 2 week history of poison ivy. The pruritic, nonpainful rash began on his bilateral lower extremities and has since spread to his upper extremities and face. It is not responding to OTC treatments. No other symptoms are reported.  Other      Past Medical History  Allergies as of 07/28/2025 - Reviewed 07/28/2025   Allergen Reaction Noted    Dog dander Cough 09/21/2020       Prescriptions Prior to Admission[1]     Medical History[2]    Surgical History[3]     reports that he has never smoked. He has never been exposed to tobacco smoke. He has never used smokeless tobacco. He reports that he does not currently use alcohol. He reports that he does not use drugs.    Review of Systems  Review of Systems                               Objective    Vitals:    07/28/25 0805   BP: 110/65   Pulse: 60   Resp: 19   Temp: 36.7 °C (98 °F)   SpO2: 98%   Weight: 90.7 kg (200 lb)   Height: 1.753 m (5' 9\")     No LMP for male patient.    Physical Exam  Vitals and nursing note reviewed.   Constitutional:       General: He is not in acute distress.     Appearance: Normal appearance.   HENT:      Nose: Nose normal.      Mouth/Throat:      Mouth: Mucous membranes are moist.      Pharynx: Oropharynx is clear.     Eyes:      Extraocular Movements: Extraocular movements intact.      Conjunctiva/sclera: Conjunctivae normal.      Pupils: Pupils are equal, round, and reactive to light.       Cardiovascular:      Rate and Rhythm: Normal rate and regular rhythm.      Pulses: Normal pulses.      Heart sounds: Normal heart sounds.   Pulmonary:      Effort: Pulmonary effort is normal.      Breath sounds: Normal breath sounds.     Musculoskeletal:      Cervical back: Normal range of motion and neck supple. No rigidity or tenderness. "   Lymphadenopathy:      Cervical: No cervical adenopathy.     Skin:     General: Skin is warm.      Capillary Refill: Capillary refill takes less than 2 seconds.      Comments: Erythematous vesicles in linear pattern on bilateral upper and lower extremities and face, surrounding left eye  Bilateral lower extremities with erythematous plaques with oozing of serosanguinous fluid and warmth     Neurological:      Mental Status: He is alert.         Procedures    Point of Care Test & Imaging Results from this visit  No results found for this visit on 07/28/25.   Imaging  No results found.    Cardiology, Vascular, and Other Imaging  No other imaging results found for the past 2 days      Diagnostic study results (if any) were reviewed by Yesenia Xie MD.    Assessment/Plan   Allergies, medications, history, and pertinent labs/EKGs/Imaging reviewed by Yesenia Xie MD.     Medical Decision Making      Orders and Diagnoses  There are no diagnoses linked to this encounter.    Medical Admin Record      Patient disposition: Home    Electronically signed by Yesenia Xie MD  8:09 AM           [1] (Not in a hospital admission)  [2] History reviewed. No pertinent past medical history.  [3]   Past Surgical History:  Procedure Laterality Date    KNEE ARTHROSCOPY W/ MENISCAL REPAIR Left     and loose body removal

## 2025-07-28 NOTE — PATIENT INSTRUCTIONS
Antibiotics as directed; take with food  Prednisone as directed; take with food  Benadryl as needed  Follow up with new or worsening symptoms

## 2025-08-28 ENCOUNTER — APPOINTMENT (OUTPATIENT)
Dept: PRIMARY CARE | Facility: CLINIC | Age: 35
End: 2025-08-28
Payer: COMMERCIAL

## 2025-08-28 VITALS
SYSTOLIC BLOOD PRESSURE: 118 MMHG | HEIGHT: 69 IN | TEMPERATURE: 98.4 F | WEIGHT: 199 LBS | BODY MASS INDEX: 29.47 KG/M2 | HEART RATE: 64 BPM | DIASTOLIC BLOOD PRESSURE: 78 MMHG

## 2025-08-28 DIAGNOSIS — Z00.00 WELL ADULT EXAM: Primary | ICD-10-CM

## 2025-08-28 DIAGNOSIS — E66.3 OVERWEIGHT (BMI 25.0-29.9): ICD-10-CM

## 2025-08-28 PROCEDURE — 1036F TOBACCO NON-USER: CPT | Performed by: FAMILY MEDICINE

## 2025-08-28 PROCEDURE — 99395 PREV VISIT EST AGE 18-39: CPT | Performed by: FAMILY MEDICINE

## 2025-08-28 PROCEDURE — 3008F BODY MASS INDEX DOCD: CPT | Performed by: FAMILY MEDICINE

## 2025-08-28 ASSESSMENT — PATIENT HEALTH QUESTIONNAIRE - PHQ9
1. LITTLE INTEREST OR PLEASURE IN DOING THINGS: NOT AT ALL
SUM OF ALL RESPONSES TO PHQ9 QUESTIONS 1 AND 2: 0
2. FEELING DOWN, DEPRESSED OR HOPELESS: NOT AT ALL